# Patient Record
Sex: MALE | Race: WHITE | NOT HISPANIC OR LATINO | URBAN - METROPOLITAN AREA
[De-identification: names, ages, dates, MRNs, and addresses within clinical notes are randomized per-mention and may not be internally consistent; named-entity substitution may affect disease eponyms.]

---

## 2019-12-05 ENCOUNTER — OFFICE (OUTPATIENT)
Dept: URBAN - METROPOLITAN AREA CLINIC 33 | Facility: CLINIC | Age: 49
End: 2019-12-05

## 2019-12-05 VITALS
HEIGHT: 76 IN | TEMPERATURE: 97.2 F | DIASTOLIC BLOOD PRESSURE: 93 MMHG | WEIGHT: 244.4 LBS | SYSTOLIC BLOOD PRESSURE: 149 MMHG | HEART RATE: 70 BPM

## 2019-12-05 DIAGNOSIS — R14.2 ERUCTATION: ICD-10-CM

## 2019-12-05 DIAGNOSIS — R12 HEARTBURN: ICD-10-CM

## 2019-12-05 DIAGNOSIS — R68.81 EARLY SATIETY: ICD-10-CM

## 2019-12-05 DIAGNOSIS — Z86.010 PERSONAL HISTORY OF COLONIC POLYPS: ICD-10-CM

## 2019-12-05 PROCEDURE — 00031: CPT

## 2019-12-05 PROCEDURE — 99204 OFFICE O/P NEW MOD 45 MIN: CPT

## 2019-12-05 NOTE — SERVICEHPINOTES
Mr. Orona is a 49 yr old male here to discuss acid reflux and belching. In the past 10 yrs he notes increasing GERD. He tried diet/lifestyle modifications and then tried Tums, then Pepcid. He also tried Zantac which worked well but he stopped it due to the medication being pulled from the market. He now has acid reflux about 3 times per week but now has a lot of belching throughout the day which is impacting his life. He has never tried a PPI. No dysphagia, anorexia, N/V, he notes early satiety and bloating. In 2012, he had a tubular adenoma removed, given a 5 yr recall, no current lower GI issues .  He denies chest pain, SOB, constipation, diarrhea, ab pain, rectal bleeding, and SOB. Occasional NSAID use. No known heart issues.

## 2020-01-02 ENCOUNTER — OFFICE (OUTPATIENT)
Dept: URBAN - METROPOLITAN AREA CLINIC 32 | Facility: CLINIC | Age: 50
End: 2020-01-02

## 2020-01-02 VITALS
HEART RATE: 83 BPM | SYSTOLIC BLOOD PRESSURE: 142 MMHG | HEART RATE: 79 BPM | OXYGEN SATURATION: 100 % | SYSTOLIC BLOOD PRESSURE: 148 MMHG | WEIGHT: 244 LBS | DIASTOLIC BLOOD PRESSURE: 81 MMHG | TEMPERATURE: 98.1 F | RESPIRATION RATE: 16 BRPM | SYSTOLIC BLOOD PRESSURE: 123 MMHG | HEART RATE: 78 BPM | DIASTOLIC BLOOD PRESSURE: 91 MMHG | DIASTOLIC BLOOD PRESSURE: 84 MMHG | RESPIRATION RATE: 20 BRPM | RESPIRATION RATE: 14 BRPM | HEART RATE: 74 BPM | RESPIRATION RATE: 24 BRPM | HEART RATE: 81 BPM | HEART RATE: 84 BPM | DIASTOLIC BLOOD PRESSURE: 76 MMHG | DIASTOLIC BLOOD PRESSURE: 74 MMHG | SYSTOLIC BLOOD PRESSURE: 116 MMHG | SYSTOLIC BLOOD PRESSURE: 111 MMHG | TEMPERATURE: 97.5 F | OXYGEN SATURATION: 97 % | OXYGEN SATURATION: 98 % | DIASTOLIC BLOOD PRESSURE: 87 MMHG | DIASTOLIC BLOOD PRESSURE: 77 MMHG | DIASTOLIC BLOOD PRESSURE: 75 MMHG | SYSTOLIC BLOOD PRESSURE: 129 MMHG | HEIGHT: 76 IN | RESPIRATION RATE: 25 BRPM | OXYGEN SATURATION: 95 % | SYSTOLIC BLOOD PRESSURE: 127 MMHG | SYSTOLIC BLOOD PRESSURE: 114 MMHG

## 2020-01-02 DIAGNOSIS — R68.81 EARLY SATIETY: ICD-10-CM

## 2020-01-02 DIAGNOSIS — D12.5 BENIGN NEOPLASM OF SIGMOID COLON: ICD-10-CM

## 2020-01-02 DIAGNOSIS — R12 HEARTBURN: ICD-10-CM

## 2020-01-02 DIAGNOSIS — R14.2 ERUCTATION: ICD-10-CM

## 2020-01-02 DIAGNOSIS — Z86.010 PERSONAL HISTORY OF COLONIC POLYPS: ICD-10-CM

## 2020-01-02 DIAGNOSIS — K22.8 OTHER SPECIFIED DISEASES OF ESOPHAGUS: ICD-10-CM

## 2020-01-02 PROBLEM — D12.0 BENIGN NEOPLASM OF CECUM: Status: ACTIVE | Noted: 2020-01-02

## 2020-01-02 LAB
GI LOWER POLYPECTOMY EXCISION - SPECM 1 JAR(S): 1: (no result)
GI LOWER POLYPECTOMY EXCISION - SPECM 1 JAR(S): 1: PDF REPORT: (no result)
GI UPPER EGD HISOLOGY - SPECM 1 JAR(S): 1: (no result)

## 2020-01-02 PROCEDURE — 45380 COLONOSCOPY AND BIOPSY: CPT | Mod: PT

## 2020-01-02 PROCEDURE — 43239 EGD BIOPSY SINGLE/MULTIPLE: CPT

## 2025-01-29 PROBLEM — Z86.0100 PERSONAL HISTORY OF COLON POLYPS: Status: ACTIVE | Noted: 2025-01-28

## 2025-02-13 ENCOUNTER — AMBULATORY SURGICAL CENTER (OUTPATIENT)
Dept: URBAN - METROPOLITAN AREA CLINIC 30 | Facility: CLINIC | Age: 55
End: 2025-02-13
Payer: COMMERCIAL

## 2025-02-13 VITALS
OXYGEN SATURATION: 96 % | RESPIRATION RATE: 24 BRPM | RESPIRATION RATE: 12 BRPM | HEART RATE: 90 BPM | SYSTOLIC BLOOD PRESSURE: 102 MMHG | HEIGHT: 76 IN | WEIGHT: 270 LBS | RESPIRATION RATE: 20 BRPM | TEMPERATURE: 98.7 F | SYSTOLIC BLOOD PRESSURE: 123 MMHG | HEART RATE: 87 BPM | DIASTOLIC BLOOD PRESSURE: 86 MMHG | OXYGEN SATURATION: 95 % | RESPIRATION RATE: 18 BRPM | DIASTOLIC BLOOD PRESSURE: 66 MMHG | OXYGEN SATURATION: 92 % | SYSTOLIC BLOOD PRESSURE: 135 MMHG | SYSTOLIC BLOOD PRESSURE: 108 MMHG | DIASTOLIC BLOOD PRESSURE: 82 MMHG | DIASTOLIC BLOOD PRESSURE: 81 MMHG | HEART RATE: 81 BPM | OXYGEN SATURATION: 94 % | SYSTOLIC BLOOD PRESSURE: 136 MMHG | OXYGEN SATURATION: 100 % | HEART RATE: 84 BPM | HEART RATE: 83 BPM | TEMPERATURE: 97.7 F | RESPIRATION RATE: 23 BRPM | DIASTOLIC BLOOD PRESSURE: 65 MMHG

## 2025-02-13 DIAGNOSIS — D12.3 BENIGN NEOPLASM OF TRANSVERSE COLON: ICD-10-CM

## 2025-02-13 DIAGNOSIS — Z09 ENCOUNTER FOR FOLLOW-UP EXAMINATION AFTER COMPLETED TREATMEN: ICD-10-CM

## 2025-02-13 DIAGNOSIS — Z86.0101 PERSONAL HISTORY OF ADENOMATOUS AND SERRATED COLON POLYPS: ICD-10-CM

## 2025-02-13 PROBLEM — K63.5 POLYP OF COLON: Status: ACTIVE | Noted: 2025-02-13

## 2025-02-13 LAB
ANATOMIC PATHOLOGY REPORT: (no result)
PDF REPORT: PDF REPORT1: (no result)

## 2025-02-13 PROCEDURE — 45380 COLONOSCOPY AND BIOPSY: CPT | Mod: PT | Performed by: INTERNAL MEDICINE
